# Patient Record
Sex: MALE | Race: BLACK OR AFRICAN AMERICAN
[De-identification: names, ages, dates, MRNs, and addresses within clinical notes are randomized per-mention and may not be internally consistent; named-entity substitution may affect disease eponyms.]

---

## 2021-08-16 ENCOUNTER — DASHBOARD ENCOUNTERS (OUTPATIENT)
Age: 76
End: 2021-08-16

## 2021-08-16 ENCOUNTER — OFFICE VISIT (OUTPATIENT)
Dept: URBAN - METROPOLITAN AREA CLINIC 17 | Facility: CLINIC | Age: 76
End: 2021-08-16
Payer: MEDICARE

## 2021-08-16 DIAGNOSIS — R63.4 WEIGHT LOSS: ICD-10-CM

## 2021-08-16 DIAGNOSIS — E55.9 VITAMIN D DEFICIENCY DISEASE: ICD-10-CM

## 2021-08-16 DIAGNOSIS — Z72.0 NICOTINE ABUSE: ICD-10-CM

## 2021-08-16 DIAGNOSIS — Z12.11 SCREEN FOR COLON CANCER: ICD-10-CM

## 2021-08-16 DIAGNOSIS — R63.0 ANOREXIA: ICD-10-CM

## 2021-08-16 PROBLEM — 34713006: Status: ACTIVE | Noted: 2021-08-16

## 2021-08-16 PROCEDURE — 99244 OFF/OP CNSLTJ NEW/EST MOD 40: CPT | Performed by: INTERNAL MEDICINE

## 2021-08-16 PROCEDURE — 99204 OFFICE O/P NEW MOD 45 MIN: CPT | Performed by: INTERNAL MEDICINE

## 2021-08-16 RX ORDER — AMLODIPINE BESYLATE 5 MG/1
TABLET ORAL
Qty: 90 | Status: ACTIVE | COMMUNITY

## 2021-08-16 RX ORDER — LISINOPRIL 40 MG/1
1 TABLET TABLET ORAL ONCE A DAY
Status: ACTIVE | COMMUNITY

## 2021-08-16 RX ORDER — HYDROCHLOROTHIAZIDE 25 MG/1
TABLET ORAL
Qty: 90 | Status: ACTIVE | COMMUNITY

## 2021-08-16 RX ORDER — SIMVASTATIN 20 MG/1
TABLET, FILM COATED ORAL
Qty: 90 | Status: ACTIVE | COMMUNITY

## 2021-08-16 NOTE — HPI-TODAY'S VISIT:
The pt has a history of HTN, HPL and family history of colon polyps with 2 brothers who now presents for a screening colon. The pt notes that his BM's are regular and he notes that he had a prostate bx at [facility] on Friday, 8/13/21. The pt smokes cigarettes and has smoked a pack a day for 27 years. The pt notes he has lost approx. 12 lbs over the last 6-8 wks. The pt notes that he has not had an EGD and colon in the past.   The patient's consultation note will be sent to the referring MD, Dr. Yessica Rose.

## 2021-11-23 ENCOUNTER — OFFICE VISIT (OUTPATIENT)
Dept: URBAN - METROPOLITAN AREA SURGERY CENTER 16 | Facility: SURGERY CENTER | Age: 76
End: 2021-11-23

## 2022-01-26 ENCOUNTER — TELEPHONE ENCOUNTER (OUTPATIENT)
Dept: URBAN - METROPOLITAN AREA CLINIC 17 | Facility: CLINIC | Age: 77
End: 2022-01-26

## 2022-05-09 PROBLEM — 79890006: Status: ACTIVE | Noted: 2021-08-16

## 2022-05-27 ENCOUNTER — OFFICE VISIT (OUTPATIENT)
Dept: URBAN - METROPOLITAN AREA SURGERY CENTER 16 | Facility: SURGERY CENTER | Age: 77
End: 2022-05-27